# Patient Record
Sex: FEMALE | URBAN - METROPOLITAN AREA
[De-identification: names, ages, dates, MRNs, and addresses within clinical notes are randomized per-mention and may not be internally consistent; named-entity substitution may affect disease eponyms.]

---

## 2023-10-31 ENCOUNTER — APPOINTMENT (RX ONLY)
Dept: URBAN - METROPOLITAN AREA CLINIC 42 | Facility: CLINIC | Age: 38
Setting detail: DERMATOLOGY
End: 2023-10-31

## 2023-10-31 DIAGNOSIS — L20.89 OTHER ATOPIC DERMATITIS: ICD-10-CM

## 2023-10-31 PROCEDURE — ? PRESCRIPTION

## 2023-10-31 PROCEDURE — ? DIAGNOSIS COMMENT

## 2023-10-31 PROCEDURE — ? COUNSELING

## 2023-10-31 PROCEDURE — 99204 OFFICE O/P NEW MOD 45 MIN: CPT

## 2023-10-31 RX ORDER — TRIAMCINOLONE ACETONIDE 1 MG/G
OINTMENT TOPICAL
Qty: 80 | Refills: 3 | Status: ERX | COMMUNITY
Start: 2023-10-31

## 2023-10-31 RX ADMIN — TRIAMCINOLONE ACETONIDE: 1 OINTMENT TOPICAL at 00:00

## 2023-10-31 ASSESSMENT — LOCATION ZONE DERM: LOCATION ZONE: TRUNK

## 2023-10-31 ASSESSMENT — LOCATION SIMPLE DESCRIPTION DERM
LOCATION SIMPLE: LEFT BREAST
LOCATION SIMPLE: CHEST

## 2023-10-31 ASSESSMENT — LOCATION DETAILED DESCRIPTION DERM
LOCATION DETAILED: LEFT LATERAL BREAST 12-1:00 REGION
LOCATION DETAILED: RIGHT LATERAL SUPERIOR CHEST

## 2023-10-31 NOTE — PROCEDURE: DIAGNOSIS COMMENT
Comment: Itchy rash on the breasts started 5 days ago.\\nPt had a similar rash in November 2022.\\nPt provided pictures of what rash looked like when it first start - picture and today’s presentation consistent with eczema.\\nDisc proper topical steroid use - Pt is breastfeeding, safe to use.\\nStart Triamcinolone 0.1% [id number], then take a 1 week break.
Render Risk Assessment In Note?: no
Detail Level: Simple

## 2023-12-19 ENCOUNTER — APPOINTMENT (RX ONLY)
Dept: URBAN - METROPOLITAN AREA CLINIC 42 | Facility: CLINIC | Age: 38
Setting detail: DERMATOLOGY
End: 2023-12-19

## 2023-12-19 DIAGNOSIS — L82.1 OTHER SEBORRHEIC KERATOSIS: ICD-10-CM

## 2023-12-19 DIAGNOSIS — L20.89 OTHER ATOPIC DERMATITIS: ICD-10-CM

## 2023-12-19 PROCEDURE — ? COUNSELING

## 2023-12-19 PROCEDURE — ? PRESCRIPTION MEDICATION MANAGEMENT

## 2023-12-19 PROCEDURE — 99214 OFFICE O/P EST MOD 30 MIN: CPT

## 2023-12-19 PROCEDURE — ? DIAGNOSIS COMMENT

## 2023-12-19 ASSESSMENT — LOCATION DETAILED DESCRIPTION DERM
LOCATION DETAILED: LEFT LATERAL BREAST 12-1:00 REGION
LOCATION DETAILED: RIGHT LATERAL SUPERIOR CHEST
LOCATION DETAILED: SUBXIPHOID
LOCATION DETAILED: RIGHT INFRAMAMMARY CREASE (INNER QUADRANT)

## 2023-12-19 ASSESSMENT — LOCATION SIMPLE DESCRIPTION DERM
LOCATION SIMPLE: ABDOMEN
LOCATION SIMPLE: CHEST
LOCATION SIMPLE: RIGHT BREAST
LOCATION SIMPLE: LEFT BREAST

## 2023-12-19 ASSESSMENT — LOCATION ZONE DERM: LOCATION ZONE: TRUNK

## 2023-12-19 NOTE — PROCEDURE: PRESCRIPTION MEDICATION MANAGEMENT
Plan: Discussed using warmer showers, moisturizing with creams and appointments, advised to keep away from infant
Continue Regimen: Triamcinolone cream bid until clear and cont for additional 2 days.
Detail Level: Zone
Render In Strict Bullet Format?: No

## 2023-12-19 NOTE — PROCEDURE: DIAGNOSIS COMMENT
Comment: Eczema on the breasts that started in the winter \\nPt used Triamcinolone for a few days- Pt states condition resolves after 3-4 days use and returns a few days later. \\n\\nDiscussed etiology of condition \\nDiscussed triggers and preventative measures \\nDiscussed proper topical steroid use - Pt is breastfeeding, safe to use.\\nContinue Triamcinolone 0.1% [id number], then take a 1 week break.
Render Risk Assessment In Note?: no
Detail Level: Simple
Comment: Discussed etiology of condition \\nReassured benign